# Patient Record
Sex: FEMALE | Race: BLACK OR AFRICAN AMERICAN | NOT HISPANIC OR LATINO | Employment: OTHER | URBAN - METROPOLITAN AREA
[De-identification: names, ages, dates, MRNs, and addresses within clinical notes are randomized per-mention and may not be internally consistent; named-entity substitution may affect disease eponyms.]

---

## 2019-06-18 ENCOUNTER — HOSPITAL ENCOUNTER (EMERGENCY)
Facility: HOSPITAL | Age: 69
Discharge: HOME | End: 2019-06-18
Attending: EMERGENCY MEDICINE | Admitting: EMERGENCY MEDICINE
Payer: COMMERCIAL

## 2019-06-18 ENCOUNTER — APPOINTMENT (EMERGENCY)
Dept: RADIOLOGY | Facility: HOSPITAL | Age: 69
End: 2019-06-18
Payer: COMMERCIAL

## 2019-06-18 ENCOUNTER — APPOINTMENT (EMERGENCY)
Dept: ULTRASOUND IMAGING | Facility: HOSPITAL | Age: 69
End: 2019-06-18
Payer: COMMERCIAL

## 2019-06-18 VITALS
TEMPERATURE: 98.3 F | OXYGEN SATURATION: 98 % | SYSTOLIC BLOOD PRESSURE: 118 MMHG | HEART RATE: 66 BPM | DIASTOLIC BLOOD PRESSURE: 68 MMHG | WEIGHT: 171 LBS | RESPIRATION RATE: 18 BRPM

## 2019-06-18 DIAGNOSIS — M25.562 ACUTE PAIN OF LEFT KNEE: Primary | ICD-10-CM

## 2019-06-18 PROCEDURE — 96372 THER/PROPH/DIAG INJ SC/IM: CPT

## 2019-06-18 PROCEDURE — 93971 EXTREMITY STUDY: CPT | Performed by: SURGERY

## 2019-06-18 PROCEDURE — 93971 EXTREMITY STUDY: CPT

## 2019-06-18 PROCEDURE — 99283 EMERGENCY DEPT VISIT LOW MDM: CPT | Performed by: PHYSICIAN ASSISTANT

## 2019-06-18 PROCEDURE — 99284 EMERGENCY DEPT VISIT MOD MDM: CPT

## 2019-06-18 PROCEDURE — 73564 X-RAY EXAM KNEE 4 OR MORE: CPT

## 2019-06-18 RX ORDER — KETOROLAC TROMETHAMINE 30 MG/ML
30 INJECTION, SOLUTION INTRAMUSCULAR; INTRAVENOUS ONCE
Status: COMPLETED | OUTPATIENT
Start: 2019-06-18 | End: 2019-06-18

## 2019-06-18 RX ORDER — IBUPROFEN 800 MG/1
800 TABLET ORAL EVERY 6 HOURS PRN
Qty: 30 TABLET | Refills: 0 | Status: SHIPPED | OUTPATIENT
Start: 2019-06-18 | End: 2019-06-28

## 2019-06-18 RX ADMIN — KETOROLAC TROMETHAMINE 30 MG: 30 INJECTION, SOLUTION INTRAMUSCULAR at 20:17

## 2021-09-16 ENCOUNTER — LAB REQUISITION (OUTPATIENT)
Dept: LAB | Facility: HOSPITAL | Age: 71
End: 2021-09-16
Payer: COMMERCIAL

## 2021-09-16 DIAGNOSIS — Z11.52 ENCOUNTER FOR SCREENING FOR COVID-19: ICD-10-CM

## 2021-09-16 LAB — SARS-COV-2 RNA RESP QL NAA+PROBE: NEGATIVE

## 2021-09-16 PROCEDURE — U0003 INFECTIOUS AGENT DETECTION BY NUCLEIC ACID (DNA OR RNA); SEVERE ACUTE RESPIRATORY SYNDROME CORONAVIRUS 2 (SARS-COV-2) (CORONAVIRUS DISEASE [COVID-19]), AMPLIFIED PROBE TECHNIQUE, MAKING USE OF HIGH THROUGHPUT TECHNOLOGIES AS DESCRIBED BY CMS-2020-01-R: HCPCS | Performed by: PEDIATRICS

## 2021-09-16 PROCEDURE — U0005 INFEC AGEN DETEC AMPLI PROBE: HCPCS | Performed by: PEDIATRICS

## 2022-12-27 ENCOUNTER — APPOINTMENT (EMERGENCY)
Dept: CT IMAGING | Facility: HOSPITAL | Age: 72
End: 2022-12-27

## 2022-12-27 ENCOUNTER — HOSPITAL ENCOUNTER (EMERGENCY)
Facility: HOSPITAL | Age: 72
Discharge: HOME | End: 2022-12-27
Attending: EMERGENCY MEDICINE

## 2022-12-27 VITALS
RESPIRATION RATE: 18 BRPM | TEMPERATURE: 97.9 F | OXYGEN SATURATION: 97 % | SYSTOLIC BLOOD PRESSURE: 139 MMHG | HEART RATE: 62 BPM | DIASTOLIC BLOOD PRESSURE: 67 MMHG

## 2022-12-27 DIAGNOSIS — M62.838 CERVICAL PARASPINAL MUSCLE SPASM: ICD-10-CM

## 2022-12-27 DIAGNOSIS — M54.2 NECK PAIN: Primary | ICD-10-CM

## 2022-12-27 LAB
ANION GAP SERPL CALCULATED.3IONS-SCNC: 7 MMOL/L (ref 4–13)
BASOPHILS # BLD AUTO: 0.02 THOUSANDS/ÂΜL (ref 0–0.1)
BASOPHILS NFR BLD AUTO: 0 % (ref 0–1)
BUN SERPL-MCNC: 17 MG/DL (ref 5–25)
CALCIUM SERPL-MCNC: 9.1 MG/DL (ref 8.4–10.2)
CHLORIDE SERPL-SCNC: 104 MMOL/L (ref 96–108)
CK SERPL-CCNC: 82 U/L (ref 26–192)
CO2 SERPL-SCNC: 28 MMOL/L (ref 21–32)
CREAT SERPL-MCNC: 0.65 MG/DL (ref 0.6–1.3)
EOSINOPHIL # BLD AUTO: 0.06 THOUSAND/ÂΜL (ref 0–0.61)
EOSINOPHIL NFR BLD AUTO: 1 % (ref 0–6)
ERYTHROCYTE [DISTWIDTH] IN BLOOD BY AUTOMATED COUNT: 13.2 % (ref 11.6–15.1)
FLUAV RNA RESP QL NAA+PROBE: NEGATIVE
FLUBV RNA RESP QL NAA+PROBE: NEGATIVE
GFR SERPL CREATININE-BSD FRML MDRD: 90 ML/MIN/1.73SQ M
GLUCOSE SERPL-MCNC: 119 MG/DL (ref 65–140)
HCT VFR BLD AUTO: 44.1 % (ref 34.8–46.1)
HGB BLD-MCNC: 13.8 G/DL (ref 11.5–15.4)
IMM GRANULOCYTES # BLD AUTO: 0.02 THOUSAND/UL (ref 0–0.2)
IMM GRANULOCYTES NFR BLD AUTO: 0 % (ref 0–2)
LYMPHOCYTES # BLD AUTO: 3.09 THOUSANDS/ÂΜL (ref 0.6–4.47)
LYMPHOCYTES NFR BLD AUTO: 26 % (ref 14–44)
MAGNESIUM SERPL-MCNC: 2.3 MG/DL (ref 1.9–2.7)
MCH RBC QN AUTO: 29.4 PG (ref 26.8–34.3)
MCHC RBC AUTO-ENTMCNC: 31.3 G/DL (ref 31.4–37.4)
MCV RBC AUTO: 94 FL (ref 82–98)
MONOCYTES # BLD AUTO: 0.62 THOUSAND/ÂΜL (ref 0.17–1.22)
MONOCYTES NFR BLD AUTO: 5 % (ref 4–12)
NEUTROPHILS # BLD AUTO: 7.92 THOUSANDS/ÂΜL (ref 1.85–7.62)
NEUTS SEG NFR BLD AUTO: 68 % (ref 43–75)
NRBC BLD AUTO-RTO: 0 /100 WBCS
PLATELET # BLD AUTO: 278 THOUSANDS/UL (ref 149–390)
PMV BLD AUTO: 9.8 FL (ref 8.9–12.7)
POTASSIUM SERPL-SCNC: 4.1 MMOL/L (ref 3.5–5.3)
RBC # BLD AUTO: 4.69 MILLION/UL (ref 3.81–5.12)
RSV RNA RESP QL NAA+PROBE: NEGATIVE
SARS-COV-2 RNA RESP QL NAA+PROBE: NEGATIVE
SODIUM SERPL-SCNC: 139 MMOL/L (ref 135–147)
WBC # BLD AUTO: 11.73 THOUSAND/UL (ref 4.31–10.16)

## 2022-12-27 RX ORDER — PREDNISONE 10 MG/1
TABLET ORAL
Qty: 30 TABLET | Refills: 0 | Status: SHIPPED | OUTPATIENT
Start: 2022-12-27

## 2022-12-27 RX ORDER — CYCLOBENZAPRINE HCL 10 MG
10 TABLET ORAL ONCE
Status: COMPLETED | OUTPATIENT
Start: 2022-12-27 | End: 2022-12-27

## 2022-12-27 RX ORDER — CYCLOBENZAPRINE HCL 10 MG
10 TABLET ORAL 2 TIMES DAILY PRN
Qty: 20 TABLET | Refills: 0 | Status: SHIPPED | OUTPATIENT
Start: 2022-12-27

## 2022-12-27 RX ORDER — ACETAMINOPHEN 325 MG/1
975 TABLET ORAL ONCE
Status: COMPLETED | OUTPATIENT
Start: 2022-12-27 | End: 2022-12-27

## 2022-12-27 RX ORDER — KETOROLAC TROMETHAMINE 30 MG/ML
15 INJECTION, SOLUTION INTRAMUSCULAR; INTRAVENOUS ONCE
Status: COMPLETED | OUTPATIENT
Start: 2022-12-27 | End: 2022-12-27

## 2022-12-27 RX ORDER — LIDOCAINE 50 MG/G
1 PATCH TOPICAL ONCE
Status: DISCONTINUED | OUTPATIENT
Start: 2022-12-27 | End: 2022-12-27 | Stop reason: HOSPADM

## 2022-12-27 RX ADMIN — ACETAMINOPHEN 975 MG: 325 TABLET ORAL at 17:12

## 2022-12-27 RX ADMIN — KETOROLAC TROMETHAMINE 15 MG: 30 INJECTION, SOLUTION INTRAMUSCULAR; INTRAVENOUS at 17:48

## 2022-12-27 RX ADMIN — LIDOCAINE 1 PATCH: 50 PATCH CUTANEOUS at 17:49

## 2022-12-27 RX ADMIN — CYCLOBENZAPRINE 10 MG: 10 TABLET, FILM COATED ORAL at 17:12

## 2022-12-27 RX ADMIN — IOHEXOL 85 ML: 350 INJECTION, SOLUTION INTRAVENOUS at 18:41

## 2022-12-27 NOTE — ED PROVIDER NOTES
History  Chief Complaint   Patient presents with   • Neck Pain     Pt presents to ED c/o neck and back pain x2 weeks  Pt states she was seen 2 weeks ago and was given prednisone with no relief  65-year-old female with OA presents with neck pain  Patient states a week ago she began to develop left-sided neck pain  Patient was evaluated by her primary care and given a course of prednisone with complete relief of symptoms  Last dose of prednisone was on 25 December  On 26 December patient began to have reoccurring left-sided neck pain that progressively worsen to involve both sides of the neck  Patient states sharp spasming nature of neck pain, difficulty swallowing and odynophagia, SOB during spasms, scalp pain  Patient states she is currently unable to turn her head to the left, and come slightly turn her head to the right  Exacerbation with movement or head turning  No previous history of similar events  States that she has been sleeping on an air mattress for the past 2 years  Patient states no known allergies  Previously on prednisone  History of OA of the hands and left knee, right knee pain  Denies alcohol, tobacco, or recreational drug use  Denies fevers, chills, chest pain, focal neurological deficits, radiation down the arms, weakness, headache, vision changes, slurred speech, facial droop, confusion, sinus congestion, cough, abdominal pain, leg swelling, trauma, changes in routine, new exercise routine, rash, saddle paresthesia, urinary retention, urinary incontinence, changes in bowel habit, trauma  History provided by:  Patient (granddaughter)   used: No    Neck Pain  Associated symptoms: no chest pain, no fever, no headaches, no numbness and no weakness        Prior to Admission Medications   Prescriptions Last Dose Informant Patient Reported?  Taking?   ibuprofen (MOTRIN) 800 mg tablet   No No   Sig: Take 1 tablet (800 mg total) by mouth every 6 (six) hours as needed for mild pain for up to 10 days      Facility-Administered Medications: None       History reviewed  No pertinent past medical history  History reviewed  No pertinent surgical history  History reviewed  No pertinent family history  I have reviewed and agree with the history as documented  E-Cigarette/Vaping     E-Cigarette/Vaping Substances     Social History     Tobacco Use   • Smoking status: Never   • Smokeless tobacco: Never   Substance Use Topics   • Alcohol use: Not Currently   • Drug use: Never        Review of Systems   Constitutional: Negative for chills, diaphoresis and fever  HENT: Negative for congestion, drooling, hearing loss, sinus pressure and sinus pain  Eyes: Negative for pain and visual disturbance  Respiratory: Positive for shortness of breath  Negative for cough and chest tightness  Cardiovascular: Negative for chest pain, palpitations and leg swelling  Gastrointestinal: Negative for abdominal pain, constipation, diarrhea, nausea and vomiting  Endocrine: Negative for polyuria  Genitourinary: Negative for dysuria, frequency and urgency  Musculoskeletal: Positive for neck pain and neck stiffness  Negative for arthralgias, back pain, gait problem and myalgias  Skin: Negative for color change and rash  Neurological: Negative for dizziness, tremors, seizures, syncope, facial asymmetry, speech difficulty, weakness, light-headedness, numbness and headaches  All other systems reviewed and are negative        Physical Exam  ED Triage Vitals [12/27/22 1525]   Temperature Pulse Respirations Blood Pressure SpO2   97 9 °F (36 6 °C) 83 20 162/77 100 %      Temp Source Heart Rate Source Patient Position - Orthostatic VS BP Location FiO2 (%)   Oral Monitor Sitting Right arm --      Pain Score       10 - Worst Possible Pain             Orthostatic Vital Signs  Vitals:    12/27/22 1525 12/27/22 1944   BP: 162/77 139/67   Pulse: 83 62   Patient Position - Orthostatic VS: Sitting Sitting       Physical Exam  Vitals and nursing note reviewed  Constitutional:       General: She is in acute distress  Appearance: Normal appearance  She is normal weight  She is not ill-appearing, toxic-appearing or diaphoretic  Comments: Rigidly sitting up and not turning  HENT:      Head: Normocephalic and atraumatic  Comments: Tenderness bilaterally over the scalp, frontoccipitalis muscle and temporalis muscle  Tense SCM and trapezius muscles bilaterally  Right Ear: Tympanic membrane, ear canal and external ear normal  There is no impacted cerumen  Left Ear: Tympanic membrane, ear canal and external ear normal  There is no impacted cerumen  Nose: Nose normal  No congestion or rhinorrhea  Comments: Tenderness to palpation over bilaterally zygoma  Mouth/Throat:      Mouth: Mucous membranes are moist       Pharynx: Oropharynx is clear  No oropharyngeal exudate or posterior oropharyngeal erythema  Comments: Uvula midline  No lesions, erythema, exudates, apical/buccal abscesses  Bilateral smooth masses with blood vessels noted on top under the tongue  Eyes:      General: No scleral icterus  Right eye: No discharge  Left eye: No discharge  Extraocular Movements: Extraocular movements intact  Conjunctiva/sclera: Conjunctivae normal    Neck:      Comments: No midline spinal tenderness over C/T/L/spine  Cardiovascular:      Rate and Rhythm: Normal rate and regular rhythm  Pulses: Normal pulses  Heart sounds: Normal heart sounds  Pulmonary:      Effort: Pulmonary effort is normal  No respiratory distress  Breath sounds: Normal breath sounds  No wheezing or rales  Abdominal:      General: There is no distension  Palpations: Abdomen is soft  There is no mass  Tenderness: There is no abdominal tenderness  There is no right CVA tenderness, left CVA tenderness, guarding or rebound     Musculoskeletal: General: Tenderness present  No swelling, deformity or signs of injury  Cervical back: Normal range of motion  Rigidity and tenderness present  Right lower leg: No edema  Left lower leg: No edema  Lymphadenopathy:      Cervical: No cervical adenopathy  Skin:     General: Skin is warm  Capillary Refill: Capillary refill takes less than 2 seconds  Coloration: Skin is not jaundiced or pale  Findings: No bruising, erythema, lesion or rash  Neurological:      General: No focal deficit present  Mental Status: She is alert and oriented to person, place, and time  Mental status is at baseline  Cranial Nerves: No cranial nerve deficit  Sensory: No sensory deficit  Motor: No weakness  Psychiatric:         Mood and Affect: Mood normal          Behavior: Behavior normal          ED Medications  Medications   acetaminophen (TYLENOL) tablet 975 mg (975 mg Oral Given 12/27/22 1712)   ketorolac (TORADOL) injection 15 mg (15 mg Intravenous Given 12/27/22 1748)   cyclobenzaprine (FLEXERIL) tablet 10 mg (10 mg Oral Given 12/27/22 1712)   iohexol (OMNIPAQUE) 350 MG/ML injection (SINGLE-DOSE) 85 mL (85 mL Intravenous Given 12/27/22 1841)       Diagnostic Studies  Results Reviewed     Procedure Component Value Units Date/Time    FLU/RSV/COVID - if FLU/RSV clinically relevant [[id number]]  (Normal) Collected: 12/27/22 1751    Lab Status: Final result Specimen: Nares from Nose Updated: 12/27/22 1840     SARS-CoV-2 Negative     INFLUENZA A PCR Negative     INFLUENZA B PCR Negative     RSV PCR Negative    Narrative:      FOR PEDIATRIC PATIENTS - copy/paste COVID Guidelines URL to browser: [email or web address]    SARS-CoV-2 assay is a Nucleic Acid Amplification assay intended for the  qualitative detection of nucleic acid from SARS-CoV-2 in nasopharyngeal  swabs   Results are for the presumptive identification of SARS-CoV-2 RNA     Positive results are indicative of infection with SARS-CoV-2, the virus  causing COVID-19, but do not rule out bacterial infection or co-infection  with other viruses  Laboratories within the [place] and its  territories are required to report all positive results to the appropriate  public health authorities  Negative results do not preclude SARS-CoV-2  infection and should not be used as the sole basis for treatment or other  patient management decisions  Negative results must be combined with  clinical observations, patient history, and epidemiological information  This test has not been FDA cleared or approved  This test has been authorized by FDA under an Emergency Use Authorization  (EUA)  This test is only authorized for the duration of time the  declaration that circumstances exist justifying the authorization of the  emergency use of an in vitro diagnostic tests for detection of SARS-CoV-2  virus and/or diagnosis of COVID-19 infection under section 564(b)(1) of  the Act, 21 U  S C  423VOE-0(H)(3), unless the authorization is terminated  or revoked sooner  The test has been validated but independent review by FDA  and CLIA is pending  Test performed using [employer] GeneXpert: This RT-PCR assay targets N2,  a region unique to SARS-CoV-2  A conserved region in the E-gene was chosen  for pan-Sarbecovirus detection which includes SARS-CoV-2  According to CMS-2020-01-R, this platform meets the definition of high-throughput technology      CK (with reflex to MB) [[id number]]  (Normal) Collected: 12/27/22 1751    Lab Status: Final result Specimen: Blood from Arm, Left Updated: 12/27/22 1830     Total CK 82 U/L     Magnesium [[id number]]  (Normal) Collected: 12/27/22 1751    Lab Status: Final result Specimen: Blood from Arm, Left Updated: 12/27/22 1827     Magnesium 2 3 mg/dL     Basic metabolic panel [[id number]] Collected: 12/27/22 1751    Lab Status: Final result Specimen: Blood from Arm, Left Updated: 12/27/22 1822     Sodium 139 mmol/L      Potassium 4 1 mmol/L      Chloride 104 mmol/L      CO2 28 mmol/L      ANION GAP 7 mmol/L      BUN 17 mg/dL      Creatinine 0 65 mg/dL      Glucose 119 mg/dL      Calcium 9 1 mg/dL      eGFR 90 ml/min/1 73sq m     Narrative:      [place] guidelines for Chronic Kidney Disease (CKD):   •  Stage 1 with normal or high GFR (GFR > 90 mL/min/1 73 square meters)  •  Stage 2 Mild CKD (GFR = 60-89 mL/min/1 73 square meters)  •  Stage 3A Moderate CKD (GFR = 45-59 mL/min/1 73 square meters)  •  Stage 3B Moderate CKD (GFR = 30-44 mL/min/1 73 square meters)  •  Stage 4 Severe CKD (GFR = 15-29 mL/min/1 73 square meters)  •  Stage 5 End Stage CKD (GFR <15 mL/min/1 73 square meters)  Note: GFR calculation is accurate only with a steady state creatinine    CBC and differential [[id number]]  (Abnormal) Collected: 12/27/22 1751    Lab Status: Final result Specimen: Blood from Arm, Right Updated: 12/27/22 1803     WBC 11 73 Thousand/uL      RBC 4 69 Million/uL      Hemoglobin 13 8 g/dL      Hematocrit 44 1 %      MCV 94 fL      MCH 29 4 pg      MCHC 31 3 g/dL      RDW 13 2 %      MPV 9 8 fL      Platelets 645 Thousands/uL      nRBC 0 /100 WBCs      Neutrophils Relative 68 %      Immat GRANS % 0 %      Lymphocytes Relative 26 %      Monocytes Relative 5 %      Eosinophils Relative 1 %      Basophils Relative 0 %      Neutrophils Absolute 7 92 Thousands/µL      Immature Grans Absolute 0 02 Thousand/uL      Lymphocytes Absolute 3 09 Thousands/µL      Monocytes Absolute 0 62 Thousand/µL      Eosinophils Absolute 0 06 Thousand/µL      Basophils Absolute 0 02 Thousands/µL                  CT soft tissue neck   Final Result by Manuel Melvin MD (12/27 1955)      No evidence of mass, lymphadenopathy or fluid collection in the neck              Workstation performed: [id number]               Procedures  Procedures      ED Course  ED Course as of 12/28/22 0150   Tue Dec 27, 2022   1804 WBC(!): 11 73   1831 Total CK: 82   2013 IMPRESSION:     No evidence of mass, lymphadenopathy or fluid collection in the neck            Workstation performed: [id number]                               SBIRT 20yo+    Flowsheet Row Most Recent Value   SBIRT (25 yo +)    In order to provide better care to our patients, we are screening all of our patients for alcohol and drug use  Would it be okay to ask you these screening questions? Yes Filed at: 12/27/2022 1621   Initial Alcohol Screen: US AUDIT-C     1  How often do you have a drink containing alcohol? 0 Filed at: 12/27/2022 1621   2  How many drinks containing alcohol do you have on a typical day you are drinking? 0 Filed at: 12/27/2022 1621   3b  FEMALE Any Age, or MALE 65+: How often do you have 4 or more drinks on one occassion? 0 Filed at: 12/27/2022 1621   Audit-C Score 0 Filed at: 12/27/2022 1621   WESLEY: How many times in the past year have you    Used an illegal drug or used a prescription medication for non-medical reasons? Never Filed at: 12/27/2022 1621                MDM  Number of Diagnoses or Management Options  Cervical paraspinal muscle spasm  Neck pain  Diagnosis management comments: 79-year-old female presents with neck pain  Previous history of the same relieved by prednisone  Previously at [facility] emergency department for the same  Appears in acute distress, nontoxic appearing, not ill-appearing, vitals within normal limits, normal neuro exam, masses noted under the tongue, cardiac exam within normal limits, lung sounds clear  Differential includes but not limited to muscle spasm, muscle strain, torticollis, cancer/tumor, viral prodrome, electrolyte abnormality, rhabdomyolysis  Electrolytes within normal limits  CK normal  Viral panel negative  CT soft tissue of the neck negative cancer/tumors  Patient states marked improvement with muscle relaxer, Toradol, Tylenol    Discharge home with pain management and referral to comprehensive spine program   Strict return precautions given  Amount and/or Complexity of Data Reviewed  Clinical lab tests: ordered and reviewed  Tests in the radiology section of CPT®: ordered and reviewed  Tests in the medicine section of CPT®: ordered and reviewed  Decide to obtain previous medical records or to obtain history from someone other than the patient: yes  Obtain history from someone other than the patient: yes  Review and summarize past medical records: yes  Independent visualization of images, tracings, or specimens: yes        Disposition  Final diagnoses:   Neck pain   Cervical paraspinal muscle spasm     Time reflects when diagnosis was documented in both MDM as applicable and the Disposition within this note     Time User Action Codes Description Comment    12/27/2022  8:33 PM Chadd Reynolds Add [M54 2] Neck pain     12/27/2022  8:37 PM Fatuma Wiley Add [P09 217] Cervical paraspinal muscle spasm       ED Disposition     ED Disposition   Discharge    Condition   Stable    Date/Time   Tue Dec 27, 2022  8:33 PM    Comment   Liam Head discharge to home/self care                 Follow-up Information     Follow up With Specialties Details Why Contact Info Additional Information    Durga Tristan MD [facility] Medicine Schedule an appointment as soon as possible for a visit in 2 days  [id number] [facility]  [address] [facility] [address]  [phone]       [facility] [address] Emergency Department Emergency Medicine Go to  If symptoms worsen [address] [facility] [address] [facility] Emergency Department, [address], [place], [place], [address]          Discharge Medication List as of 12/27/2022  8:39 PM      START taking these medications    Details   cyclobenzaprine (FLEXERIL) 10 mg tablet Take 1 tablet (10 mg total) by mouth 2 (two) times a day as needed for muscle spasms, Starting Tue 12/27/2022, Normal      predniSONE 10 mg tablet 4 tabs PO daily x 3 days, then 3 x 3 days, then 2 x 3 days, then 1 x 3 days  , Normal         CONTINUE these medications which have NOT CHANGED    Details   ibuprofen (MOTRIN) 800 mg tablet Take 1 tablet (800 mg total) by mouth every 6 (six) hours as needed for mild pain for up to 10 days, Starting Tue 6/18/2019, Until Fri 6/28/2019, Print               PDMP Review     None           ED Provider  Attending physically available and evaluated [facility] Lan HAMILTON managed the patient along with the ED Attending      Electronically Signed by         Linda Sol MD  12/28/22 6298

## 2022-12-27 NOTE — ED ATTENDING ATTESTATION
12/27/2022  IVj MD, saw and evaluated the patient  I have discussed the patient with the resident/non-physician practitioner and agree with the resident's/non-physician practitioner's findings, Plan of Care, and MDM as documented in the resident's/non-physician practitioner's note, except where noted  All available labs and Radiology studies were reviewed  I was present for key portions of any procedure(s) performed by the resident/non-physician practitioner and I was immediately available to provide assistance  At this point I agree with the current assessment done in the Emergency Department  I have conducted an independent evaluation of this patient a history and physical is as follows:    24-year-old female presented for evaluation of about 2 weeks of generalized neck pain  Nuys any specific injury  States that it was just there when she woke up 1 day  She reports that it has been consistent  She did get some relief but not complete resolution when she was put on prednisone by her PCP  States that the symptoms then got worse when she ended her course of medication  She reports having somewhat similar pain in the past but it has only lasted a day at a time  Denies any fever, chills, headaches, visual changes  She does report pain and subsequent difficulty with swallowing  She also has difficulty opening her mouth without pain  On exam she appears very uncomfortable  Her vitals are normal, however  She maintains her neck in an upright position and has difficulty ranging in any direction  She is able to rotate to the right about 45 degrees, to the left barely past midline  She is unable to flex or extend the neck as well  She has tenderness of bilateral SCMs and trapezius muscles as well as some tender anterior cervical lymphadenopathy  Has trismus and hypertrophy of the buccal surface of the lower gum underneath the tongue    She also has tenderness along the supraclavicular and infraclavicular area bilaterally but no palpable adenopathy  Plan labs, soft tissue neck CT with recon of the C-spine, pain control, reevaluate  ED Course  ED Course as of 12/28/22 1350   Tue Dec 27, 2022   2033 No acute findings on CT of the neck  Plan discharge home with muscle relaxer and referral to comp spine/PT  2034 Labwork unremarkable           Critical Care Time  Procedures

## 2022-12-28 ENCOUNTER — TELEPHONE (OUTPATIENT)
Dept: PHYSICAL THERAPY | Facility: OTHER | Age: 72
End: 2022-12-28

## 2022-12-28 NOTE — ED NOTES
Patient reports that pain has improved after receiving medications       Jael Allison RN  12/27/22 0770

## 2022-12-28 NOTE — DISCHARGE INSTRUCTIONS
-You have been given a referral to the comprehensive spine program   They will call you to set up an appointment and establish you with a provider  They may also start you with physical therapy  -You have been prescribed cyclobenzaprine or Flexeril  Do not operate heavy machinery or drive while on this medication    Do not mix this with alcohol   -We have given you a another course of prednisone   -Please return to the emergency department if you begin develop fevers, chills, severe headaches, vision changes, one-sided weakness, slurred speech, confusion, chest pain
Right calf pain/CALF PAIN

## 2023-01-25 ENCOUNTER — TELEPHONE (OUTPATIENT)
Dept: PHYSICAL THERAPY | Facility: OTHER | Age: 73
End: 2023-01-25

## 2023-08-01 ENCOUNTER — NEW PATIENT (OUTPATIENT)
Dept: URBAN - METROPOLITAN AREA CLINIC 6 | Facility: CLINIC | Age: 73
End: 2023-08-01

## 2023-08-01 DIAGNOSIS — H40.013: ICD-10-CM

## 2023-08-01 DIAGNOSIS — H25.813: ICD-10-CM

## 2023-08-01 DIAGNOSIS — H43.813: ICD-10-CM

## 2023-08-01 PROCEDURE — 92020 GONIOSCOPY: CPT

## 2023-08-01 PROCEDURE — 76514 ECHO EXAM OF EYE THICKNESS: CPT

## 2023-08-01 PROCEDURE — 92004 COMPRE OPH EXAM NEW PT 1/>: CPT

## 2023-08-01 PROCEDURE — 92015 DETERMINE REFRACTIVE STATE: CPT

## 2023-08-01 PROCEDURE — 92133 CPTRZD OPH DX IMG PST SGM ON: CPT

## 2023-08-01 ASSESSMENT — PACHYMETRY
OD_CT_UM: 462
OS_CT_UM: 467

## 2023-08-01 ASSESSMENT — KERATOMETRY
OS_K2POWER_DIOPTERS: 42.25
OS_AXISANGLE2_DEGREES: 90
OS_K1POWER_DIOPTERS: 42.00
OD_AXISANGLE2_DEGREES: 86
OD_K1POWER_DIOPTERS: 42.25
OD_K2POWER_DIOPTERS: 42.50
OS_AXISANGLE_DEGREES: 180
OD_AXISANGLE_DEGREES: 176

## 2023-08-01 ASSESSMENT — TONOMETRY
OS_IOP_MMHG: 12
OD_IOP_MMHG: 14
OS_IOP_MMHG: 13
OD_IOP_MMHG: 11

## 2023-08-01 ASSESSMENT — VISUAL ACUITY
OS_PH: 20/40
OS_SC: 20/50+2
OD_PH: 20/30
OD_SC: 20/70-1

## 2024-08-16 ENCOUNTER — ESTABLISHED COMPREHENSIVE EXAM (OUTPATIENT)
Dept: URBAN - METROPOLITAN AREA CLINIC 6 | Facility: CLINIC | Age: 74
End: 2024-08-16

## 2024-08-16 DIAGNOSIS — Q13.89: ICD-10-CM

## 2024-08-16 DIAGNOSIS — H25.813: ICD-10-CM

## 2024-08-16 DIAGNOSIS — H40.013: ICD-10-CM

## 2024-08-16 DIAGNOSIS — H04.123: ICD-10-CM

## 2024-08-16 DIAGNOSIS — H43.813: ICD-10-CM

## 2024-08-16 PROCEDURE — 92014 COMPRE OPH EXAM EST PT 1/>: CPT

## 2024-08-16 PROCEDURE — 92133 CPTRZD OPH DX IMG PST SGM ON: CPT

## 2024-08-16 ASSESSMENT — VISUAL ACUITY
OU_CC: J3
OS_SC: 20/200
OD_SC: 20/100
OU_SC: 20/100
OD_PH: 20/50-1
OS_PH: 20/50-1

## 2024-08-16 ASSESSMENT — TONOMETRY
OD_IOP_MMHG: 11
OS_IOP_MMHG: 13

## 2024-08-16 ASSESSMENT — KERATOMETRY
OS_K1POWER_DIOPTERS: 42.00
OS_AXISANGLE_DEGREES: 180
OD_K1POWER_DIOPTERS: 42.25
OD_K2POWER_DIOPTERS: 42.50
OS_AXISANGLE2_DEGREES: 90
OD_AXISANGLE2_DEGREES: 86
OS_K2POWER_DIOPTERS: 42.25
OD_AXISANGLE_DEGREES: 176